# Patient Record
Sex: MALE | Race: WHITE
[De-identification: names, ages, dates, MRNs, and addresses within clinical notes are randomized per-mention and may not be internally consistent; named-entity substitution may affect disease eponyms.]

---

## 2018-02-15 ENCOUNTER — HOSPITAL ENCOUNTER (EMERGENCY)
Dept: HOSPITAL 17 - NEPD | Age: 63
Discharge: HOME | End: 2018-02-15
Payer: COMMERCIAL

## 2018-02-15 VITALS
HEART RATE: 113 BPM | SYSTOLIC BLOOD PRESSURE: 150 MMHG | RESPIRATION RATE: 16 BRPM | TEMPERATURE: 99.1 F | DIASTOLIC BLOOD PRESSURE: 70 MMHG | OXYGEN SATURATION: 97 %

## 2018-02-15 VITALS — HEIGHT: 72 IN | WEIGHT: 198.42 LBS | BODY MASS INDEX: 26.87 KG/M2

## 2018-02-15 DIAGNOSIS — B02.9: Primary | ICD-10-CM

## 2018-02-15 PROCEDURE — 99283 EMERGENCY DEPT VISIT LOW MDM: CPT

## 2018-02-15 NOTE — PD
HPI


Chief Complaint:  Skin Problem


Time Seen by Provider:  07:55


Travel History


International Travel<30 days:  No


Contact w/Intl Traveler<30days:  No


Traveled to known affect area:  No





History of Present Illness


HPI


62-year-old male presents emergency Department with complaint of a rash to his 

left upper extremity since either Sunday or Monday.  He reports it is itchy at 

times.  Denies pain.  Says he does have pain in his left upper back and has 

noticed a spot to his left flank area that is consistent with the current rash 

on his left arm.  Denies fever, vomiting.  Denies shortness of breath, wheezing

, airway edema, tongue edema.  Denies new exposures to lotions, soaps, 

detergents, foods, perfumes.  Did take Aleve for the first time on Saturday and 

then noted the rash on either Sunday or Monday and does not know if taking the 

Aleve isn't associated.  Has history of chickenpox.  His wife has had shingles 

in the past.  He has tried hydrocortisone and Benadryl for symptom management.  

No known aggravating or relieving factors.  Primary care provider is in [place] 

[place].  Allergies to penicillin, Dilaudid, morphine.  History of lymphoma.  

Has no other medical complaints.  No other modifying factors or associated 

signs and symptoms.





[facility]


Social History


Tobacco Use:  No





Allergies-Medications


(Allergen,Severity, Reaction):  


Coded Allergies:  


     hydromorphone (Verified  Allergy, Severe, hallucinations, 2/15/18)


     morphine (Verified  Allergy, Severe, hallucinations, 2/15/18)


     penicillin V (Verified  Allergy, Unknown, 2/15/18)


Reported Meds & Prescriptions





Reported Meds & Active Scripts


Active


Acyclovir 800 Mg Tab 800 Mg PO 5 TIMES A DAY 7 Days








Review of Systems


Except as stated in HPI:  all other systems reviewed are Neg





Physical Exam


Narrative


GENERAL: Well-nourished, well-developed  male patient, in no acute 

distress; afebrile, nontoxic-appearing


SKIN: Warm and dry.  Left arm with erythremic grouped vesicles present in a 

dermatomal distribution; 1 vesicle noted to the left upper back/flank area.  No 

drainage or edema.  


HEAD: Atraumatic. Normocephalic. 


EYES: Pupils equal and round. No scleral icterus. No injection or drainage.


ENT: Mucosa pink and moist.  Airway patent.  


NECK: Trachea midline.  


CARDIOVASCULAR: Regular rate and rhythm.  No murmur appreciated.  


RESPIRATORY: No accessory muscle use.  Lungs sounds clear and equal bilaterally.


GASTROINTESTINAL: Flat.  


MUSCULOSKELETAL: No obvious deformities. No clubbing.  No cyanosis.  No edema. 


NEUROLOGICAL: Awake and alert.  Oriented 3.  No obvious cranial nerve 

deficits.  Motor grossly within normal limits. Normal speech. 


PSYCHIATRIC: Appropriate mood and affect; insight and judgment normal.





Data


Data


Last Documented VS





Vital Signs








  Date Time  Temp Pulse Resp B/P (MAP) Pulse Ox O2 Delivery O2 Flow Rate FiO2


 


2/15/18 08:40  91      


 


2/15/18 07:47 99.1  16  97   








Orders





 Orders


Acyclovir (Zovirax) (2/15/18 08:15)


Ed Discharge Order (2/15/18 08:16)








[facility]


Medical Decision Making


Medical Screen Exam Complete:  Yes


Emergency Medical Condition:  Yes


Medical Record Reviewed:  Yes


Differential Diagnosis


Shingles, hives, nonspecific rash, contact dermatitis


Narrative Course


62-year-old male physical exam consistent with shingles rash to the left upper 

extremity.  Patient is afebrile and nontoxic-appearing.  Denies fever, 

vomiting.  Acyclovir administered in the ER.  I offered The patient pain 

control and he declined.  Acyclovir prescribed for home.  I offered the patient 

prescription for pain medication for home and he says he doesn't like to take 

pain medication and will just take ibuprofen or Tylenol as needed.  Instructed 

Patient to avoid Aleve and he verbalized understanding and agreement.  

Instructed patient to follow up with primary care provider.  Patient verbalizes 

understanding and agreement with treatment plan.  Patient is medically cleared 

and stable for discharge.  Discussed reasons to return to the emergency 

department.  Patient agrees with treatment plan.  The patients vital signs are 

stable and the patient is stable for outpatient follow-up and treatment.  

Patient discharged home, stable and in no acute distress.





Diagnosis





 Primary Impression:  


 Shingles rash


 Qualified Codes:  B02.9 - Zoster without complications


Referrals:  


Primary Care Physician


Patient Instructions:  General Instructions, Shingles (ED)





***Additional Instructions:  


Shingles is contagious


Keep area covered with clothing to avoid spreading to others


Wash hands frequently


Take medications as prescribed


Cold, wet compresses to the rash to help relieve itching and pain as needed


Cool Bath to help relieve itching and pain as needed


Follow-up with a primary care provider


Return to the emergency department immediately with worsening of symptoms


***Med/Other Pt SpecificInfo:  Prescription(s) given


Scripts


Acyclovir (Acyclovir) 800 Mg Tab


800 MG PO 5 TIMES A DAY for Mgmt Viral Infection for 7 Days, TAB 0 Refills


   Prov: Aubree Monroy         2/15/18


Disposition:  01 DISCHARGE HOME


Condition:  Stable











Aubree Monroy Feb 15, 2018 08:10